# Patient Record
Sex: MALE | URBAN - METROPOLITAN AREA
[De-identification: names, ages, dates, MRNs, and addresses within clinical notes are randomized per-mention and may not be internally consistent; named-entity substitution may affect disease eponyms.]

---

## 2024-05-15 ENCOUNTER — OFFICE VISIT (OUTPATIENT)
Dept: OTOLARYNGOLOGY | Facility: CLINIC | Age: 74
End: 2024-05-15
Payer: MEDICARE

## 2024-05-15 VITALS — BODY MASS INDEX: 21.69 KG/M2 | WEIGHT: 135 LBS | HEIGHT: 66 IN

## 2024-05-15 DIAGNOSIS — C44.212 BASAL CELL CARCINOMA, EAR, RIGHT: Primary | ICD-10-CM

## 2024-05-15 PROCEDURE — 99203 OFFICE O/P NEW LOW 30 MIN: CPT | Mod: PBBFAC | Performed by: OTOLARYNGOLOGY

## 2024-05-15 PROCEDURE — 99204 OFFICE O/P NEW MOD 45 MIN: CPT | Mod: S$PBB,,, | Performed by: OTOLARYNGOLOGY

## 2024-05-15 PROCEDURE — 99999 PR PBB SHADOW E&M-NEW PATIENT-LVL III: CPT | Mod: PBBFAC,,, | Performed by: OTOLARYNGOLOGY

## 2024-05-15 NOTE — Clinical Note
Hey - hoping you can help me with this patient.  He has a biopsy proven basal cell on the helix of his right ear.  He is interested in having Mohs surgery, and I told him that I would see what I could do as far as setting that up.  Let me know what I would need to do or who I might talk to about that.  He has medicare.  Thanks!  James

## 2024-05-15 NOTE — PROGRESS NOTES
"Referring Provider:    HAZEL Calvetr Md  [address]  [place] [address]  Subjective:   Patient: Gilbert Castro [id number], :1950   Visit date:5/15/2024 2:19 PM    Chief Complaint:  Consult (Pt has a bx proven BCC on the back of the right ear )    HPI:    Prior notes reviewed by myself.  Clinical documentation obtained by nursing staff reviewed.     73-year-old gentleman presents for evaluation of basal cell carcinoma right external ear.  He has biopsy-proven basal cell and was referred by the [facility] dermatology clinic.  He states that this lesion has been slowly growing for quite some time.  He denies any significant bleeding or pain.  He is interested in MOHS surgery.        Objective:     Physical Exam:  Vitals:  Ht 5' 6" (1.676 m)   Wt 61.2 kg (135 lb)   BMI 21.79 kg/m²   General appearance:  Well developed, well nourished    Ears:  Otoscopy of external auditory canals and tympanic membranes was normal, clinical speech reception thresholds grossly intact, 1 cm scabbed area at biopsy site right helix.    Nose:  No masses/lesions of external nose, nasal mucosa, septum, and turbinates were within normal limits.    Mouth:  No mass/lesion of lips, teeth, gums, hard/soft palate, tongue, tonsils, or oropharynx.    Neck & Lymphatics:  No cervical lymphadenopathy, no neck mass/crepitus/ asymmetry, trachea is midline, no thyroid enlargement/tenderness/mass.        [x]  Data Reviewed:    No results found for: "WBC", "HGB", "HCT", "MCV", "LABPLAT", "EOSINOPHIL"    Reviewed pathology report: BCCA    [x]  Independent interpretation of test:                Discussed with Dr. Nicole Mir, dermatology      Assessment & Plan:   Basal cell carcinoma, ear, right  -     Ambulatory referral/consult to Dermatology; Future; Expected date: 2024        He has a scabbed area on his right external ear consistent with this biopsy site and proven basal cell carcinoma.  He is interested in Mohs " surgery.  I have placed a referral to Dr. Gunnar Suarez.    James Almanza M.D.  Department of Otolaryngology - Head & Neck Surgery  [address]  [place] [address]  P: [phone]  F: [phone]        DISCLAIMER: This note was prepared with [employer] voice recognition transcription software. Garbled syntax, mangled pronouns, and other bizarre constructions may be attributed to that software system. While efforts were made to correct any mistakes made by this voice recognition program, some errors and/or omissions may remain in the note that were missed when the note was originally created.

## 2024-05-30 ENCOUNTER — TELEPHONE (OUTPATIENT)
Dept: OTOLARYNGOLOGY | Facility: CLINIC | Age: 74
End: 2024-05-30
Payer: MEDICARE

## 2024-05-30 NOTE — TELEPHONE ENCOUNTER
----- Message from Tala Head sent at 5/30/2024  2:30 PM CDT -----  Regarding: medical advice  Contact: Gilbert  Type:  Needs Medical Advice    Who Called: Gilbert  Symptoms (please be specific):    How long has patient had these symptoms:    Pharmacy name and phone #:    Would the patient rather a call back or a response via [facility]? call   Best Call Back Number:  [phone]  Additional Information:  Gilbert is requesting a callback from Dr. Almanza today in regard to the surgeon not reaching out to the patient to schedule his procedure.

## 2024-05-30 NOTE — TELEPHONE ENCOUNTER
----- Message from Tala Head sent at 5/30/2024  2:30 PM CDT -----  Regarding: medical advice  Contact: Gilbert  Type:  Needs Medical Advice    Who Called: Gilbert  Symptoms (please be specific):    How long has patient had these symptoms:    Pharmacy name and phone #:    Would the patient rather a call back or a response via [facility]? call   Best Call Back Number:  [phone]  Additional Information:  Gilbert is requesting a callback from Dr. Almanza today in regard to the surgeon not reaching out to the patient to schedule his procedure.

## 2024-06-03 ENCOUNTER — TELEPHONE (OUTPATIENT)
Dept: OTOLARYNGOLOGY | Facility: CLINIC | Age: 74
End: 2024-06-03
Payer: MEDICARE

## 2024-06-03 NOTE — TELEPHONE ENCOUNTER
----- Message from Kary Magdaleno sent at 6/3/2024  3:34 PM CDT -----  Regarding: pt callback  Type:Patient Returning Call:Pt        Who Called: MA        Who Left Message for Patient:         Does the patient know what this is regarding? Missed call         Best Call Back Number: Telephone Information:  [employer]          [phone]            Additional Information:

## 2024-06-03 NOTE — TELEPHONE ENCOUNTER
Call placed back to patient in regards to concerns about referral. Informed patient that I contacted [facility]. [facility] assistant was gone for the day. Patient's number, , and name was left with  who answered the phone.   states she will give message to [facility] assistant. Patient verbalized understanding.

## 2024-06-03 NOTE — TELEPHONE ENCOUNTER
Call placed to patient in regards to referral to [facility]. Patient did not answer, LVM to call back.

## 2024-06-10 NOTE — TELEPHONE ENCOUNTER
Staff -    Let's follow up on this again today.  He should have been scheduled with them weeks ago.  Thank you.

## 2024-07-01 ENCOUNTER — TELEPHONE (OUTPATIENT)
Dept: DERMATOLOGY | Facility: CLINIC | Age: 74
End: 2024-07-01
Payer: MEDICARE